# Patient Record
Sex: FEMALE | Race: BLACK OR AFRICAN AMERICAN | NOT HISPANIC OR LATINO | Employment: OTHER | URBAN - METROPOLITAN AREA
[De-identification: names, ages, dates, MRNs, and addresses within clinical notes are randomized per-mention and may not be internally consistent; named-entity substitution may affect disease eponyms.]

---

## 2022-02-08 ENCOUNTER — TELEPHONE (OUTPATIENT)
Dept: GASTROENTEROLOGY | Facility: CLINIC | Age: 67
End: 2022-02-08

## 2022-02-08 ENCOUNTER — TELEPHONE (OUTPATIENT)
Dept: GASTROENTEROLOGY | Facility: CLINIC | Age: 67
End: 2022-02-08
Payer: MEDICARE

## 2022-02-08 NOTE — TELEPHONE ENCOUNTER
----- Message from Joseline Rodriguez sent at 2/8/2022  9:31 AM CST -----  Katekhoi LongBlevins stated that someone left a voicemail for her to give the office a call back to reschedule one of her upcoming appointment with Dr Tejada. Please give her a call back at [phone] (home)

## 2022-02-08 NOTE — TELEPHONE ENCOUNTER
----- Message from Kiera Emanuel sent at 2/8/2022  9:47 AM CST -----  .Type:  Patient Returning Call    Who Called:self  Who Left Message for Patient:  Does the patient know what this is regarding?: appt  Would the patient rather a call back or a response via [facility]? call  Best Call Back Number:.[phone] (home)   Additional Information:

## 2022-04-25 DIAGNOSIS — K92.1 MELENA: Primary | ICD-10-CM

## 2022-04-28 ENCOUNTER — TELEPHONE (OUTPATIENT)
Dept: GASTROENTEROLOGY | Facility: CLINIC | Age: 67
End: 2022-04-28
Payer: MEDICARE

## 2022-04-28 NOTE — TELEPHONE ENCOUNTER
----- Message from Joseline Rodriguez sent at 4/28/2022 11:20 AM CDT -----  Kate Blevins is calling to schedule her colonoscopy and upper gi with Dr Tejada. Please give her a call back at [phone] (home)

## 2022-04-28 NOTE — TELEPHONE ENCOUNTER
She is not due until 12/10/2023 for her colon recheck. Last OV says okay to EGD as needed. Okay to schedule?- PARIS

## 2022-04-28 NOTE — TELEPHONE ENCOUNTER
----- Message from Joseline Rodriguez sent at 4/28/2022 11:20 AM CDT -----  Kate Blevins is calling to schedule her colonoscopy and upper gi with Dr Tejada. Please give her a call back at [phone] (home)

## 2022-05-04 NOTE — TELEPHONE ENCOUNTER
Add her to [facility] clinic 5/12/2022. [facility] 2020[facility]. Due for colon as well. Not sure if should be at same time as EGD based on clinical presentation and medical conditions.  NBP

## 2022-05-04 NOTE — TELEPHONE ENCOUNTER
Called patient to add to [facility] clinic no answer left message-PARIS

## 2022-05-06 NOTE — PROGRESS NOTES
Clinic Note    Reason for visit:  The primary encounter diagnosis was Melena. Diagnoses of Esophageal dysphagia, Gastroesophageal reflux disease, unspecified whether esophagitis present, Chronic idiopathic constipation, Delayed gastric emptying, and History of colon polyps were also pertinent to this visit.    PCP: Heather Ghosh       HPI:  This is a 67 y.o. female who is established with Dr. Tejada. Patient has h/o esophageal stricture s/p dilation 2018 dilated to 48Fr and GERD, taking pantoprazole 40 daily. Patient had episode of melena and epigastric pain around a month ago, which began after taking ibuprofen for a migraine. Stool has returned to normal. Patient has h/o constipation and stool has been pellet-like, has tried MiraLAX in the past, tried Trulance and reports that it worked but is unable to take it due to cost. Denies BRBPR. Also reports dysphagia with solids x 3 months.     Last EGD 1/28/2020: DBx nl, GBx wnl, EBx reflux  Last colonoscopy 12/10/2018: 1 TA, 1 hyperp, repeat colon in 5y    Review of Systems   Constitutional: Negative for chills, diaphoresis, fatigue, fever and unexpected weight change.   HENT: Negative for mouth sores, nosebleeds, postnasal drip, sore throat, trouble swallowing and voice change.    Eyes: Negative for pain, discharge and eye dryness.   Respiratory: Negative for apnea, cough, choking, chest tightness, shortness of breath and wheezing.    Cardiovascular: Negative for chest pain, palpitations, leg swelling and claudication.   Gastrointestinal: Positive for constipation and reflux. Negative for abdominal distention, abdominal pain, anal bleeding, blood in stool, change in bowel habit, diarrhea, nausea, rectal pain, vomiting, fecal incontinence and change in bowel habit.   Genitourinary: Negative for bladder incontinence, difficulty urinating, dysuria, flank pain, frequency and hematuria.   Musculoskeletal: Negative for arthralgias, back pain, joint swelling and joint  "deformity.   Integumentary:  Negative for color change, rash and wound.   Allergic/Immunologic: Positive for environmental allergies. Negative for food allergies.   Neurological: Negative for seizures, facial asymmetry, speech difficulty, weakness, headaches and memory loss.   Hematological: Negative for adenopathy. Does not bruise/bleed easily.   Psychiatric/Behavioral: Negative for agitation, behavioral problems, confusion, hallucinations and sleep disturbance.      Past Medical History:   Diagnosis Date    Anxiety disorder, unspecified     Arthritis     Brain aneurysm     Disorder of thyroid, unspecified     Dyslipidemia     Essential (primary) hypertension     Fibromyalgia     GERD (gastroesophageal reflux disease)     High blood pressure     Migraines     Mild intermittent asthma, uncomplicated     Rheumatoid arthritis, unspecified     Systemic lupus erythematosus, organ or system involvement unspecified      Past Surgical History:   Procedure Laterality Date    APPENDECTOMY      BACK SURGERY  2007     SECTION      CHOLECYSTECTOMY      KNEE SURGERY       Family History   Problem Relation Age of Onset    Ovarian cancer Mother 84    Hypertension Mother      Social History     Tobacco Use    Smoking status: Never Smoker    Smokeless tobacco: Never Used   Substance Use Topics    Alcohol use: Never    Drug use: Never     Review of patient's allergies indicates:   Allergen Reactions    Latex, natural rubber     Penicillin             Vital Signs:  BP (!) 154/78 (BP Location: Right arm, Patient Position: Sitting, BP Method: Medium (Automatic))   Ht 5' 3" (1.6 m)   Wt 76.2 kg (168 lb)   BMI 29.76 kg/m²   Body mass index is 29.76 kg/m².      Physical Exam  Vitals reviewed.   Constitutional:       General: She is awake. She is not in acute distress.     Appearance: Normal appearance. She is well-developed. She is not ill-appearing, toxic-appearing or diaphoretic.   HENT:      Head: " Normocephalic and atraumatic.      Nose: Nose normal.      Mouth/Throat:      Mouth: Mucous membranes are moist.      Pharynx: Oropharynx is clear. No oropharyngeal exudate or posterior oropharyngeal erythema.   Eyes:      General: Lids are normal. Gaze aligned appropriately. No scleral icterus.        Right eye: No discharge.         Left eye: No discharge.      Extraocular Movements: Extraocular movements intact.      Conjunctiva/sclera: Conjunctivae normal.   Neck:      Trachea: Trachea normal.   Cardiovascular:      Rate and Rhythm: Normal rate and regular rhythm.      Pulses:           Radial pulses are 2+ on the right side and 2+ on the left side.   Pulmonary:      Effort: Pulmonary effort is normal. No respiratory distress.      Breath sounds: Normal breath sounds. No stridor. No wheezing or rhonchi.   Chest:      Chest wall: No tenderness.   Abdominal:      General: Bowel sounds are normal. There is no distension.      Palpations: Abdomen is soft. There is no fluid wave, hepatomegaly or mass.      Tenderness: There is no abdominal tenderness. There is no guarding or rebound.   Musculoskeletal:         General: No tenderness or deformity.      Cervical back: Full passive range of motion without pain and neck supple. No tenderness.      Right lower leg: No edema.      Left lower leg: No edema.   Lymphadenopathy:      Cervical: No cervical adenopathy.   Skin:     General: Skin is warm and dry.      Capillary Refill: Capillary refill takes less than 2 seconds.      Coloration: Skin is not cyanotic, jaundiced or pale.      Findings: No rash.   Neurological:      General: No focal deficit present.      Mental Status: She is alert and oriented to person, place, and time.      Cranial Nerves: No facial asymmetry.      Motor: No tremor.   Psychiatric:         Attention and Perception: Attention normal.         Mood and Affect: Mood and affect normal.         Speech: Speech normal.         Behavior: Behavior normal.  Behavior is cooperative.            All of the data above and below has been reviewed by myself and any further interpretations will be reflected in the assessment and plan.   The data includes review of external notes, and independent interpretation of lab results, procedures, x-rays, and imaging reports.      Assessment:  Melena  -     Ambulatory Referral to External Surgery    Esophageal dysphagia  -     Ambulatory Referral to External Surgery    Gastroesophageal reflux disease, unspecified whether esophagitis present  -     Ambulatory Referral to External Surgery    Chronic idiopathic constipation  -     lubiprostone (AMITIZA) 8 MCG Cap; Take 1 capsule (8 mcg total) by mouth 2 (two) times daily with meals.  Dispense: 60 capsule; Refill: 1    Delayed gastric emptying    History of colon polyps    ulcer v H. Pylori v inflam v GERD  Due for screening colonoscopy 12/2023. Trial of amitiza for constipation.     Recommendations:  Schedule upper endoscopy with Dr. Tejada.   Continue pantoprazole 40 mg once daily.  Trial of Amitiza for constipation. Patient will let us know if this helps her symptoms. If unable to take Amitiza she will begin taking MiraLAX, 1 capful daily, and titrate dose up or down until having daily, soft bowel movements.    Risks, benefits, and alternatives of medical management, any associated procedures, and/or treatment discussed with the patient. Patient given opportunity to ask questions and voices understanding. Patient has elected to proceed with the recommended care modalities as discussed.    Follow up with Dr. Tejada in 6 months.     Order summary:  Orders Placed This Encounter   Procedures    Ambulatory Referral to External Surgery          Megan Santana NP    This document may have been created using a voice recognition transcribing system. Incorrect words or phrases may have been missed during proofreading. Please interpret accordingly or contact me for clarification.

## 2022-05-12 ENCOUNTER — OFFICE VISIT (OUTPATIENT)
Dept: GASTROENTEROLOGY | Facility: CLINIC | Age: 67
End: 2022-05-12
Payer: MEDICARE

## 2022-05-12 VITALS
HEIGHT: 63 IN | SYSTOLIC BLOOD PRESSURE: 154 MMHG | BODY MASS INDEX: 29.77 KG/M2 | WEIGHT: 168 LBS | DIASTOLIC BLOOD PRESSURE: 78 MMHG

## 2022-05-12 DIAGNOSIS — K30 DELAYED GASTRIC EMPTYING: ICD-10-CM

## 2022-05-12 DIAGNOSIS — K21.9 GASTROESOPHAGEAL REFLUX DISEASE, UNSPECIFIED WHETHER ESOPHAGITIS PRESENT: ICD-10-CM

## 2022-05-12 DIAGNOSIS — K59.04 CHRONIC IDIOPATHIC CONSTIPATION: ICD-10-CM

## 2022-05-12 DIAGNOSIS — K92.1 MELENA: Primary | ICD-10-CM

## 2022-05-12 DIAGNOSIS — Z86.010 HISTORY OF COLON POLYPS: ICD-10-CM

## 2022-05-12 DIAGNOSIS — R13.19 ESOPHAGEAL DYSPHAGIA: ICD-10-CM

## 2022-05-12 PROCEDURE — 99203 OFFICE O/P NEW LOW 30 MIN: CPT | Mod: S$GLB,,,

## 2022-05-12 PROCEDURE — 99203 PR OFFICE/OUTPT VISIT, NEW, LEVL III, 30-44 MIN: ICD-10-PCS | Mod: S$GLB,,,

## 2022-05-12 RX ORDER — LUBIPROSTONE 8 UG/1
8 CAPSULE ORAL 2 TIMES DAILY WITH MEALS
Qty: 60 CAPSULE | Refills: 1 | Status: SHIPPED | OUTPATIENT
Start: 2022-05-12 | End: 2022-11-14

## 2022-05-12 RX ORDER — PILOCARPINE HYDROCHLORIDE 5 MG/1
5 TABLET, FILM COATED ORAL 3 TIMES DAILY
COMMUNITY
Start: 2022-04-19

## 2022-05-12 RX ORDER — AMITRIPTYLINE HYDROCHLORIDE 50 MG/1
150 TABLET, FILM COATED ORAL NIGHTLY
COMMUNITY
Start: 2022-03-15

## 2022-05-12 RX ORDER — GABAPENTIN 300 MG/1
300 CAPSULE ORAL NIGHTLY
COMMUNITY
Start: 2022-04-19

## 2022-05-12 RX ORDER — ZOLPIDEM TARTRATE 5 MG/1
5 TABLET ORAL DAILY
COMMUNITY
Start: 2022-02-14 | End: 2023-11-14

## 2022-05-12 RX ORDER — LISINOPRIL 10 MG/1
10 TABLET ORAL 2 TIMES DAILY
COMMUNITY
Start: 2022-02-13 | End: 2023-11-14

## 2022-05-12 RX ORDER — LISINOPRIL 20 MG/1
20 TABLET ORAL DAILY
COMMUNITY
Start: 2022-03-15

## 2022-05-12 RX ORDER — ATORVASTATIN CALCIUM 40 MG/1
40 TABLET, FILM COATED ORAL NIGHTLY
COMMUNITY
Start: 2022-04-19 | End: 2023-11-14

## 2022-05-12 RX ORDER — MONTELUKAST SODIUM 10 MG/1
10 TABLET ORAL DAILY
COMMUNITY
Start: 2022-03-17

## 2022-05-12 RX ORDER — ADALIMUMAB 40MG/0.4ML
40 KIT SUBCUTANEOUS WEEKLY
COMMUNITY
Start: 2022-04-25 | End: 2023-02-16

## 2022-05-12 RX ORDER — PANTOPRAZOLE SODIUM 40 MG/1
40 TABLET, DELAYED RELEASE ORAL DAILY
COMMUNITY
Start: 2021-12-27 | End: 2022-05-26 | Stop reason: SDUPTHER

## 2022-05-12 RX ORDER — PREDNISONE 5 MG/1
5 TABLET ORAL DAILY
COMMUNITY
Start: 2022-02-16 | End: 2023-11-14

## 2022-05-12 RX ORDER — TIZANIDINE 2 MG/1
2 TABLET ORAL 2 TIMES DAILY PRN
COMMUNITY
Start: 2022-04-19

## 2022-05-12 RX ORDER — ALPRAZOLAM 0.5 MG/1
0.5 TABLET ORAL DAILY PRN
COMMUNITY
Start: 2022-04-28 | End: 2023-11-14

## 2022-05-13 ENCOUNTER — TELEPHONE (OUTPATIENT)
Dept: GASTROENTEROLOGY | Facility: CLINIC | Age: 67
End: 2022-05-13
Payer: MEDICARE

## 2022-05-13 DIAGNOSIS — K21.9 GASTROESOPHAGEAL REFLUX DISEASE, UNSPECIFIED WHETHER ESOPHAGITIS PRESENT: ICD-10-CM

## 2022-05-13 DIAGNOSIS — K92.1 MELENA: Primary | ICD-10-CM

## 2022-05-13 DIAGNOSIS — R13.19 ESOPHAGEAL DYSPHAGIA: ICD-10-CM

## 2022-05-13 NOTE — TELEPHONE ENCOUNTER
----- Message from Kiera Emanuel sent at 5/13/2022 11:06 AM CDT -----  pt states that she can hardly swallow, needs call back..[phone] (home)

## 2022-05-19 ENCOUNTER — TELEPHONE (OUTPATIENT)
Dept: GASTROENTEROLOGY | Facility: CLINIC | Age: 67
End: 2022-05-19
Payer: MEDICARE

## 2022-05-19 ENCOUNTER — OUTSIDE PLACE OF SERVICE (OUTPATIENT)
Dept: GASTROENTEROLOGY | Facility: CLINIC | Age: 67
End: 2022-05-19
Payer: MEDICARE

## 2022-05-19 DIAGNOSIS — R13.19 ESOPHAGEAL DYSPHAGIA: Primary | ICD-10-CM

## 2022-05-19 LAB — EGD FOLLOW UP EXTERNAL: NORMAL

## 2022-05-19 PROCEDURE — 43235 EGD DIAGNOSTIC BRUSH WASH: CPT | Mod: ,,, | Performed by: INTERNAL MEDICINE

## 2022-05-19 PROCEDURE — 43235 PR EGD, FLEX, DIAGNOSTIC: ICD-10-PCS | Mod: ,,, | Performed by: INTERNAL MEDICINE

## 2022-05-19 NOTE — TELEPHONE ENCOUNTER
Food in stomach during EGD today limited any dilation. Her stricture was fairly patient. Okay to send EM order and plan for EGD with dilation at [facility] with liquids the day prior 6/14/2022.  DAVID

## 2022-06-12 RX ORDER — PANTOPRAZOLE SODIUM 40 MG/1
40 TABLET, DELAYED RELEASE ORAL DAILY
Qty: 90 TABLET | Refills: 1 | Status: SHIPPED | OUTPATIENT
Start: 2022-06-12 | End: 2022-11-14 | Stop reason: SDUPTHER

## 2022-06-13 ENCOUNTER — TELEPHONE (OUTPATIENT)
Dept: GASTROENTEROLOGY | Facility: CLINIC | Age: 67
End: 2022-06-13
Payer: MEDICARE

## 2022-06-13 NOTE — TELEPHONE ENCOUNTER
----- Message from Kiera Emanuel sent at 6/13/2022  8:12 AM CDT -----  PT NEEDS CALL BACK TO RESCHEDULE DUE TO MARK..[phone] (home)

## 2022-08-08 ENCOUNTER — TELEPHONE (OUTPATIENT)
Dept: GASTROENTEROLOGY | Facility: CLINIC | Age: 67
End: 2022-08-08
Payer: MEDICARE

## 2022-08-08 DIAGNOSIS — Z86.010 HISTORY OF COLON POLYPS: Primary | ICD-10-CM

## 2022-08-08 DIAGNOSIS — K92.1 MELENA: ICD-10-CM

## 2022-08-08 DIAGNOSIS — R13.19 ESOPHAGEAL DYSPHAGIA: ICD-10-CM

## 2022-08-08 DIAGNOSIS — K21.9 GASTROESOPHAGEAL REFLUX DISEASE, UNSPECIFIED WHETHER ESOPHAGITIS PRESENT: ICD-10-CM

## 2022-08-08 DIAGNOSIS — K59.04 CHRONIC IDIOPATHIC CONSTIPATION: ICD-10-CM

## 2022-08-08 DIAGNOSIS — K30 DELAYED GASTRIC EMPTYING: ICD-10-CM

## 2022-08-09 ENCOUNTER — OUTSIDE PLACE OF SERVICE (OUTPATIENT)
Dept: GASTROENTEROLOGY | Facility: CLINIC | Age: 67
End: 2022-08-09
Payer: MEDICARE

## 2022-08-09 LAB — EGD FOLLOW UP EXTERNAL: NORMAL

## 2022-08-09 PROCEDURE — 43248 EGD GUIDE WIRE INSERTION: CPT | Mod: ,,, | Performed by: INTERNAL MEDICINE

## 2022-08-09 PROCEDURE — 43248 PR EGD, FLEX, W/DILATION OVER GUIDEWIRE: ICD-10-PCS | Mod: ,,, | Performed by: INTERNAL MEDICINE

## 2022-11-14 ENCOUNTER — OFFICE VISIT (OUTPATIENT)
Dept: GASTROENTEROLOGY | Facility: CLINIC | Age: 67
End: 2022-11-14
Payer: MEDICARE

## 2022-11-14 ENCOUNTER — TELEPHONE (OUTPATIENT)
Dept: GASTROENTEROLOGY | Facility: CLINIC | Age: 67
End: 2022-11-14

## 2022-11-14 VITALS
DIASTOLIC BLOOD PRESSURE: 76 MMHG | WEIGHT: 171 LBS | SYSTOLIC BLOOD PRESSURE: 148 MMHG | OXYGEN SATURATION: 98 % | TEMPERATURE: 99 F | HEART RATE: 74 BPM | HEIGHT: 64 IN | BODY MASS INDEX: 29.19 KG/M2

## 2022-11-14 DIAGNOSIS — Z12.11 SCREENING FOR COLON CANCER: ICD-10-CM

## 2022-11-14 DIAGNOSIS — Z86.010 HISTORY OF COLON POLYPS: Primary | ICD-10-CM

## 2022-11-14 DIAGNOSIS — Z86.010 HISTORY OF COLON POLYPS: ICD-10-CM

## 2022-11-14 DIAGNOSIS — K30 DELAYED GASTRIC EMPTYING: ICD-10-CM

## 2022-11-14 DIAGNOSIS — R13.19 ESOPHAGEAL DYSPHAGIA: ICD-10-CM

## 2022-11-14 DIAGNOSIS — K21.9 GASTROESOPHAGEAL REFLUX DISEASE, UNSPECIFIED WHETHER ESOPHAGITIS PRESENT: Primary | ICD-10-CM

## 2022-11-14 PROCEDURE — 99215 OFFICE O/P EST HI 40 MIN: CPT | Mod: S$GLB,,, | Performed by: INTERNAL MEDICINE

## 2022-11-14 PROCEDURE — 99215 PR OFFICE/OUTPT VISIT, EST, LEVL V, 40-54 MIN: ICD-10-PCS | Mod: S$GLB,,, | Performed by: INTERNAL MEDICINE

## 2022-11-14 RX ORDER — SOD SULF/POT CHLORIDE/MAG SULF 1.479 G
12 TABLET ORAL DAILY
Qty: 24 TABLET | Refills: 0 | Status: SHIPPED | OUTPATIENT
Start: 2022-11-14 | End: 2023-11-14

## 2022-11-14 RX ORDER — PANTOPRAZOLE SODIUM 40 MG/1
40 TABLET, DELAYED RELEASE ORAL DAILY
Qty: 90 TABLET | Refills: 4 | Status: SHIPPED | OUTPATIENT
Start: 2022-11-14 | End: 2023-11-14 | Stop reason: SDUPTHER

## 2022-11-14 RX ORDER — SOD SULF/POT CHLORIDE/MAG SULF 1.479 G
12 TABLET ORAL DAILY
Qty: 24 TABLET | Refills: 0 | Status: SHIPPED | OUTPATIENT
Start: 2022-11-14 | End: 2022-11-14

## 2022-11-14 NOTE — PROGRESS NOTES
Clinic Note    Reason for visit:  The primary encounter diagnosis was Gastroesophageal reflux disease, unspecified whether esophagitis present. Diagnoses of Delayed gastric emptying, Esophageal dysphagia, History of colon polyps, and Screening for colon cancer were also pertinent to this visit.    PCP: Heather Ghosh       HPI:  This is a 67 y.o. female we performed an upper endoscopy on her earlier this year with dilation to 54 [place].  Her swallowing symptoms have significantly improved since then.  Tomato based foods and eating late or some of her reflux triggers.  Otherwise she has not been swallowing.  In doing well overall.  She is a history of colonic polyps would like to continue on with her colonoscopy for surveillance given her family history of cancers.    Review of Systems   Constitutional:  Negative for chills, diaphoresis, fatigue, fever and unexpected weight change.   HENT:  Negative for mouth sores, nosebleeds, postnasal drip, sore throat, trouble swallowing and voice change.    Eyes:  Negative for pain, discharge and eye dryness.   Respiratory:  Negative for apnea, cough, choking, chest tightness, shortness of breath and wheezing.    Cardiovascular:  Negative for chest pain, palpitations, leg swelling and claudication.   Gastrointestinal:  Positive for reflux. Negative for abdominal distention, abdominal pain, anal bleeding, blood in stool, change in bowel habit, constipation, diarrhea, nausea, rectal pain, vomiting, fecal incontinence and change in bowel habit.   Genitourinary:  Positive for bladder incontinence. Negative for difficulty urinating, dysuria, flank pain, frequency and hematuria.   Musculoskeletal:  Negative for arthralgias, back pain, joint swelling and joint deformity.   Integumentary:  Negative for color change, rash and wound.   Allergic/Immunologic: Positive for environmental allergies and food allergies.   Neurological:  Negative for seizures, facial asymmetry, speech  difficulty, weakness, headaches and memory loss.   Hematological:  Negative for adenopathy. Does not bruise/bleed easily.   Psychiatric/Behavioral:  Negative for agitation, behavioral problems, confusion, hallucinations and sleep disturbance.       Past Medical History:   Diagnosis Date    Anxiety disorder, unspecified     Arthritis     BMI 29.0-29.9,adult     Colon polyp     Disorder of thyroid, unspecified     Dyslipidemia     Essential (primary) hypertension     Fibromyalgia     GERD (gastroesophageal reflux disease)     High blood pressure     Migraines     Mild intermittent asthma, uncomplicated     Rheumatoid arthritis, unspecified     Sleep apnea, unspecified     C PAP    Systemic lupus erythematosus, organ or system involvement unspecified      Past Surgical History:   Procedure Laterality Date    APPENDECTOMY      BACK SURGERY  2007     SECTION      CHOLECYSTECTOMY      COLONOSCOPY      KNEE SURGERY Right     TOE SURGERY Left     UPPER GASTROINTESTINAL ENDOSCOPY       Family History   Problem Relation Age of Onset    Ovarian cancer Mother 84    Hypertension Mother     Dementia Father     Liver cancer Neg Hx     Liver disease Neg Hx     Esophageal cancer Neg Hx     Pancreatic cancer Neg Hx     Stomach cancer Neg Hx     Colon cancer Neg Hx     Ulcerative colitis Neg Hx     Crohn's disease Neg Hx     Throat cancer Neg Hx      Social History     Tobacco Use    Smoking status: Never    Smokeless tobacco: Never   Substance Use Topics    Alcohol use: Never    Drug use: Never     Review of patient's allergies indicates:   Allergen Reactions    Latex, natural rubber Swelling    Penicillin Swelling        Medication List with Changes/Refills   New Medications    SOD SULF-POT CHLORIDE-MAG SULF (SUTAB) 1.479-0.188- 0.225 GRAM TABLET    Take 12 tablets by mouth once daily. Take according to package instructions with indicated amount of water. No breakfast day before test. May substitute with Suprep, Clenpiq,  "Plenvu, Moviprep or GoLytely based on Rx plan and patient preference.   Current Medications    ALPRAZOLAM (XANAX) 0.5 MG TABLET    Take 0.5 mg by mouth daily as needed.    AMITRIPTYLINE (ELAVIL) 50 MG TABLET    Take 50 mg by mouth every evening.    ATORVASTATIN (LIPITOR) 40 MG TABLET    Take 40 mg by mouth every evening.    GABAPENTIN (NEURONTIN) 300 MG CAPSULE    Take 300 mg by mouth nightly.    HUMIRA,CF, PEN 40 MG/0.4 ML PNKT    Inject 40 mg as directed once a week.    LISINOPRIL (PRINIVIL,ZESTRIL) 20 MG TABLET    Take 20 mg by mouth once daily.    LISINOPRIL 10 MG TABLET    Take 10 mg by mouth 2 (two) times daily.    MONTELUKAST (SINGULAIR) 10 MG TABLET    Take 10 mg by mouth once daily at 6am.    PILOCARPINE (SALAGEN) 5 MG TAB    Take 5 mg by mouth 3 (three) times daily.    PREDNISONE (DELTASONE) 5 MG TABLET    Take 5 mg by mouth once daily at 6am.    TIZANIDINE (ZANAFLEX) 2 MG TABLET    Take 2 mg by mouth 2 (two) times daily as needed.    ZOLPIDEM (AMBIEN) 5 MG TAB    Take 5 mg by mouth once daily.   Changed and/or Refilled Medications    Modified Medication Previous Medication    PANTOPRAZOLE (PROTONIX) 40 MG TABLET pantoprazole (PROTONIX) 40 MG tablet       Take 1 tablet (40 mg total) by mouth once daily.    Take 1 tablet (40 mg total) by mouth once daily at 6am.   Discontinued Medications    LUBIPROSTONE (AMITIZA) 8 MCG CAP    Take 1 capsule (8 mcg total) by mouth 2 (two) times daily with meals.         Vital Signs:  BP (!) 148/76   Pulse 74   Temp 98.6 °F (37 °C)   Ht 5' 4" (1.626 m)   Wt 77.6 kg (171 lb)   SpO2 98%   BMI 29.35 kg/m²         Physical Exam  Vitals reviewed.   Constitutional:       General: She is awake. She is not in acute distress.     Appearance: Normal appearance. She is well-developed. She is not ill-appearing, toxic-appearing or diaphoretic.   HENT:      Head: Normocephalic and atraumatic.      Nose: Nose normal.      Mouth/Throat:      Mouth: Mucous membranes are moist.      " Pharynx: Oropharynx is clear. No oropharyngeal exudate or posterior oropharyngeal erythema.   Eyes:      General: Lids are normal. Gaze aligned appropriately. No scleral icterus.        Right eye: No discharge.         Left eye: No discharge.      Extraocular Movements: Extraocular movements intact.      Conjunctiva/sclera: Conjunctivae normal.   Neck:      Trachea: Trachea normal.   Cardiovascular:      Rate and Rhythm: Normal rate and regular rhythm.      Pulses:           Radial pulses are 2+ on the right side and 2+ on the left side.   Pulmonary:      Effort: Pulmonary effort is normal. No respiratory distress.      Breath sounds: Normal breath sounds. No stridor. No wheezing or rhonchi.   Chest:      Chest wall: No tenderness.   Abdominal:      General: Bowel sounds are normal. There is no distension.      Palpations: Abdomen is soft. There is no fluid wave, hepatomegaly or mass.      Tenderness: There is no abdominal tenderness. There is no guarding or rebound.   Musculoskeletal:         General: No tenderness or deformity.      Cervical back: Full passive range of motion without pain and neck supple. No tenderness.      Right lower leg: No edema.      Left lower leg: No edema.   Lymphadenopathy:      Cervical: No cervical adenopathy.   Skin:     General: Skin is warm and dry.      Capillary Refill: Capillary refill takes less than 2 seconds.      Coloration: Skin is not cyanotic, jaundiced or pale.      Findings: No rash.   Neurological:      General: No focal deficit present.      Mental Status: She is alert and oriented to person, place, and time.      Cranial Nerves: No facial asymmetry.      Motor: No tremor.   Psychiatric:         Attention and Perception: Attention normal.         Mood and Affect: Mood and affect normal.         Speech: Speech normal.         Behavior: Behavior normal. Behavior is cooperative.          All of the data above and below has been reviewed by myself and any further  interpretations will be reflected in the assessment and plan.   The data includes review of external notes, and independent interpretation of lab results, procedures, x-rays, and imaging reports.      Assessment:  Gastroesophageal reflux disease, unspecified whether esophagitis present  -     pantoprazole (PROTONIX) 40 MG tablet; Take 1 tablet (40 mg total) by mouth once daily.  Dispense: 90 tablet; Refill: 4    Delayed gastric emptying    Esophageal dysphagia    History of colon polyps  -     Cancel: Ambulatory Referral to External Surgery  -     Ambulatory Referral to External Surgery    Screening for colon cancer  -     Cancel: Ambulatory Referral to External Surgery  -     Ambulatory Referral to External Surgery    Other orders  -     sod sulf-pot chloride-mag sulf (SUTAB) 1.479-0.188- 0.225 gram tablet; Take 12 tablets by mouth once daily. Take according to package instructions with indicated amount of water. No breakfast day before test. May substitute with Suprep, Clenpiq, Plenvu, Moviprep or GoLytely based on Rx plan and patient preference.  Dispense: 24 tablet; Refill: 0  -     Discontinue: sod sulf-pot chloride-mag sulf (SUTAB) 1.479-0.188- 0.225 gram tablet; Take 12 tablets by mouth once daily. Take according to package instructions with indicated amount of water. No breakfast day before test. May substitute with Suprep, Clenpiq, Plenvu, Moviprep or GoLytely based on Rx plan and patient preference.  Dispense: 24 tablet; Refill: 0    May try to minimize acid suppression therapy by going to every other day as able.  This is if you are continuing to doing well.  Notify my office if any issues with her swallowing.  We will plan for repeat colonoscopy.      Recommendations:  Schedule colonoscopy.    Notify my office with you new health insurance information once you have it so that we can run your procedure through that plan.    Continue pantoprazole daily.  May attempt to take every other day.    Please  notify my office if you have not been contacted within two weeks after any procedures, submitting any samples (biopsies, blood, stool, urine, etc.) or after any imaging (X-ray, CT, MRI, etc.).     Risks, benefits, and alternatives of medical management, any associated procedures, and/or treatment discussed with the patient. Patient given opportunity to ask questions and voices understanding. Patient has elected to proceed with the recommended care modalities as discussed.    Follow up in about 1 year (around 11/14/2023).    Order summary:  Orders Placed This Encounter   Procedures    Ambulatory Referral to External Surgery        Instructed patient to notify my office if they have not been contacted within two weeks after any procedures, submitting any samples (biopsies, blood, stool, urine, etc.) or after any imaging (X-ray, CT, MRI, etc.).     Iveth Tejada MD    This document may have been created using a voice recognition transcribing system. Incorrect words or phrases may have been missed during proofreading. Please interpret accordingly or contact me for clarification.

## 2022-11-14 NOTE — PATIENT INSTRUCTIONS
Schedule colonoscopy.    Notify my office with you new health insurance information once you have it so that we can run your procedure through that plan.    Continue pantoprazole daily.  May attempt to take every other day.    Please notify my office if you have not been contacted within two weeks after any procedures, submitting any samples (biopsies, blood, stool, urine, etc.) or after any imaging (X-ray, CT, MRI, etc.).

## 2022-11-14 NOTE — TELEPHONE ENCOUNTER
"[facility]  [address] [address]  Phone: [phone]  Fax: [phone]    History & Physical         Provider: Dr. Iveth Tejada    Patient Name: Kate HARKINS (age):1955  67 y.o.           Gender: female   Phone: [phone]     Referring Physician: Heather Ghosh     Vital Signs:   Height - 5' 4"  Weight - 171 lbs  BMI -  29.35    Plan: Colonoscopy    Encounter Diagnoses   Name Primary?    History of colon polyps Yes    Screening for colon cancer            History:      Past Medical History:   Diagnosis Date    Anxiety disorder, unspecified     Arthritis     BMI 29.0-29.9,adult     Colon polyp     Disorder of thyroid, unspecified     Dyslipidemia     Essential (primary) hypertension     Fibromyalgia     GERD (gastroesophageal reflux disease)     High blood pressure     Migraines     Mild intermittent asthma, uncomplicated     Rheumatoid arthritis, unspecified     Sleep apnea, unspecified     C PAP    Systemic lupus erythematosus, organ or system involvement unspecified       Past Surgical History:   Procedure Laterality Date    APPENDECTOMY      BACK SURGERY  2007     SECTION      CHOLECYSTECTOMY      COLONOSCOPY      KNEE SURGERY Right     TOE SURGERY Left     UPPER GASTROINTESTINAL ENDOSCOPY        Medication List with Changes/Refills   Current Medications    ALPRAZOLAM (XANAX) 0.5 MG TABLET    Take 0.5 mg by mouth daily as needed.    AMITRIPTYLINE (ELAVIL) 50 MG TABLET    Take 50 mg by mouth every evening.    ATORVASTATIN (LIPITOR) 40 MG TABLET    Take 40 mg by mouth every evening.    GABAPENTIN (NEURONTIN) 300 MG CAPSULE    Take 300 mg by mouth nightly.    HUMIRA,CF, PEN 40 MG/0.4 ML PNKT    Inject 40 mg as directed once a week.    LISINOPRIL (PRINIVIL,ZESTRIL) 20 MG TABLET    Take 20 mg by mouth once daily.    LISINOPRIL 10 MG TABLET    Take 10 mg by mouth 2 (two) " times daily.    MONTELUKAST (SINGULAIR) 10 MG TABLET    Take 10 mg by mouth once daily at 6am.    PANTOPRAZOLE (PROTONIX) 40 MG TABLET    Take 1 tablet (40 mg total) by mouth once daily.    PILOCARPINE (SALAGEN) 5 MG TAB    Take 5 mg by mouth 3 (three) times daily.    PREDNISONE (DELTASONE) 5 MG TABLET    Take 5 mg by mouth once daily at 6am.    SOD SULF-POT CHLORIDE-MAG SULF (SUTAB) 1.479-0.188- 0.225 GRAM TABLET    Take 12 tablets by mouth once daily. Take according to package instructions with indicated amount of water. No breakfast day before test. May substitute with Suprep, Clenpiq, Plenvu, Moviprep or GoLytely based on Rx plan and patient preference.    TIZANIDINE (ZANAFLEX) 2 MG TABLET    Take 2 mg by mouth 2 (two) times daily as needed.    ZOLPIDEM (AMBIEN) 5 MG TAB    Take 5 mg by mouth once daily.      Review of patient's allergies indicates:   Allergen Reactions    Latex, natural rubber Swelling    Penicillin Swelling      Family History   Problem Relation Age of Onset    Ovarian cancer Mother 84    Hypertension Mother     Dementia Father     Liver cancer Neg Hx     Liver disease Neg Hx     Esophageal cancer Neg Hx     Pancreatic cancer Neg Hx     Stomach cancer Neg Hx     Colon cancer Neg Hx     Ulcerative colitis Neg Hx     Crohn's disease Neg Hx     Throat cancer Neg Hx       Social History     Tobacco Use    Smoking status: Never    Smokeless tobacco: Never   Substance Use Topics    Alcohol use: Never    Drug use: Never        Physical Examination:     General Appearance:___________________________  HEENT:  _____________________________________  Abdomen:____________________________________  Heart:________________________________________  Lungs:_______________________________________  Extremities:___________________________________  Skin:_________________________________________  Endocrine:____________________________________  Genitourinary:_________________________________  Neurological:__________________________________      Patient has been evaluated immediately prior to sedation and is medically cleared for endoscopy with IVCS as an ASA class: ______      Physician Signature: _________________________       Date: ________  Time: ________

## 2022-11-14 NOTE — LETTER
November 14, 2022        Heather Ghosh MD  [address] [facility]  [address]  [place] [address]             [facility]  [address] [address]  Phone: [phone]  Fax: [phone]   Patient: Kate Blevins   MR Number: [id number]   YOB: 1955   Date of Visit: 11/14/2022       Dear Dr. Ghosh:    Thank you for referring Kate Blevins to me for evaluation. Attached you will find relevant portions of my assessment and plan of care.    If you have questions, please do not hesitate to call me. I look forward to following Kate Blevins along with you.    Sincerely,      Iveth Tejada MD            CC  No Recipients    Enclosure

## 2023-02-14 ENCOUNTER — TELEPHONE (OUTPATIENT)
Dept: GASTROENTEROLOGY | Facility: CLINIC | Age: 68
End: 2023-02-14

## 2023-02-14 ENCOUNTER — OUTSIDE PLACE OF SERVICE (OUTPATIENT)
Dept: GASTROENTEROLOGY | Facility: CLINIC | Age: 68
End: 2023-02-14
Payer: MEDICARE

## 2023-02-14 DIAGNOSIS — K22.2 ESOPHAGEAL STRICTURE: ICD-10-CM

## 2023-02-14 DIAGNOSIS — K44.9 HIATAL HERNIA: ICD-10-CM

## 2023-02-14 DIAGNOSIS — R13.10 DYSPHAGIA, UNSPECIFIED TYPE: Primary | ICD-10-CM

## 2023-02-14 LAB — CRC RECOMMENDATION EXT: NORMAL

## 2023-02-14 PROCEDURE — G0105 COLORECTAL SCRN; HI RISK IND: HCPCS | Mod: ,,, | Performed by: INTERNAL MEDICINE

## 2023-02-14 PROCEDURE — G0105 COLORECTAL SCRN; HI RISK IND: ICD-10-PCS | Mod: ,,, | Performed by: INTERNAL MEDICINE

## 2023-02-14 NOTE — TELEPHONE ENCOUNTER
The patient reported just after her colonoscopy that she has been having occasional dysphagia to solids.  On her May 2022 upper endoscopy she was found to have a 13 mm stricture at the EG junction.  She had solid food in her stomach limiting some views and limiting dilation.  The plan was to have a repeat upper endoscopy in the hospital sending with propofol.  In August of 2022 she had that repeat upper endoscopy in her stricture was dilated up to a size 54 [place].  Will plan for repeat dilation.    Okay to contact patient and schedule for upper endoscopy with dilation in the hospital setting.  She should be on liquids the day prior to procedure due to a history of solid food in the stomach on upper endoscopy.  Diagnoses are dysphagia, esophageal stricture, hiatal hernia.  Okay to add to March 21, 2023 procedure schedule at the hospital.  NBP

## 2023-02-16 RX ORDER — HYDROCODONE BITARTRATE AND ACETAMINOPHEN 5; 325 MG/1; MG/1
1 TABLET ORAL EVERY 6 HOURS PRN
COMMUNITY
Start: 2022-12-29

## 2023-02-16 RX ORDER — OLOPATADINE HYDROCHLORIDE 1 MG/ML
1 SOLUTION/ DROPS OPHTHALMIC
COMMUNITY
Start: 2023-01-17

## 2023-02-20 VITALS — HEIGHT: 64 IN | WEIGHT: 171 LBS | BODY MASS INDEX: 29.19 KG/M2

## 2023-02-20 NOTE — TELEPHONE ENCOUNTER
"[facility]  [address] [address]  Phone: [phone]  Fax: [phone]    History & Physical         Provider: Dr. Iveth Tejada    Patient Name: Kate HARKINS (age):1955  67 y.o.           Gender: female   Phone: [phone]     Referring Physician: Heather Ghosh     Vital Signs:   Height - 5' 4"  Weight - 171 lb   BMI -  29.3    Plan: EGD w/ Dil    Encounter Diagnoses   Name Primary?    Dysphagia, unspecified type Yes    Esophageal stricture     Hiatal hernia            History:      Past Medical History:   Diagnosis Date    Anxiety disorder, unspecified     Arthritis     BMI 29.0-29.9,adult     Colon polyp     Disorder of thyroid, unspecified     Dyslipidemia     Essential (primary) hypertension     Fibromyalgia     GERD (gastroesophageal reflux disease)     High blood pressure     Migraines     Mild intermittent asthma, uncomplicated     Rheumatoid arthritis, unspecified     Sleep apnea, unspecified     C PAP    Systemic lupus erythematosus, organ or system involvement unspecified       Past Surgical History:   Procedure Laterality Date    APPENDECTOMY      BACK SURGERY  2007     SECTION      CHOLECYSTECTOMY      COLONOSCOPY      KNEE SURGERY Right     TOE SURGERY Left     UPPER GASTROINTESTINAL ENDOSCOPY        Medication List with Changes/Refills   Current Medications    ALPRAZOLAM (XANAX) 0.5 MG TABLET    Take 0.5 mg by mouth daily as needed.    AMITRIPTYLINE (ELAVIL) 50 MG TABLET    Take 50 mg by mouth every evening.    ATORVASTATIN (LIPITOR) 40 MG TABLET    Take 40 mg by mouth every evening.    GABAPENTIN (NEURONTIN) 300 MG CAPSULE    Take 300 mg by mouth nightly.    HYDROCODONE-ACETAMINOPHEN (NORCO) 5-325 MG PER TABLET    Take 1 tablet by mouth every 6 (six) hours as needed.    LISINOPRIL (PRINIVIL,ZESTRIL) 20 MG TABLET    Take 20 mg by mouth once daily.    " LISINOPRIL 10 MG TABLET    Take 10 mg by mouth 2 (two) times daily.    MONTELUKAST (SINGULAIR) 10 MG TABLET    Take 10 mg by mouth once daily at 6am.    OLOPATADINE (PATANOL) 0.1 % OPHTHALMIC SOLUTION    Place 1 drop into both eyes.    PANTOPRAZOLE (PROTONIX) 40 MG TABLET    Take 1 tablet (40 mg total) by mouth once daily.    PILOCARPINE (SALAGEN) 5 MG TAB    Take 5 mg by mouth 3 (three) times daily.    PREDNISONE (DELTASONE) 5 MG TABLET    Take 5 mg by mouth once daily at 6am.    SOD SULF-POT CHLORIDE-MAG SULF (SUTAB) 1.479-0.188- 0.225 GRAM TABLET    Take 12 tablets by mouth once daily. Take according to package instructions with indicated amount of water. No breakfast day before test. May substitute with Suprep, Clenpiq, Plenvu, Moviprep or GoLytely based on Rx plan and patient preference.    TIZANIDINE (ZANAFLEX) 2 MG TABLET    Take 2 mg by mouth 2 (two) times daily as needed.    ZOLPIDEM (AMBIEN) 5 MG TAB    Take 5 mg by mouth once daily.   Discontinued Medications    HUMIRA,CF, PEN 40 MG/0.4 ML PNKT    Inject 40 mg as directed once a week.      Review of patient's allergies indicates:   Allergen Reactions    Latex, natural rubber Swelling    Penicillin Swelling      Family History   Problem Relation Age of Onset    Ovarian cancer Mother 84    Hypertension Mother     Dementia Father     Liver cancer Neg Hx     Liver disease Neg Hx     Esophageal cancer Neg Hx     Pancreatic cancer Neg Hx     Stomach cancer Neg Hx     Colon cancer Neg Hx     Ulcerative colitis Neg Hx     Crohn's disease Neg Hx     Throat cancer Neg Hx       Social History     Tobacco Use    Smoking status: Never    Smokeless tobacco: Never   Substance Use Topics    Alcohol use: Never    Drug use: Never        Physical Examination:     General Appearance:___________________________  HEENT:  _____________________________________  Abdomen:____________________________________  Heart:________________________________________  Lungs:_______________________________________  Extremities:___________________________________  Skin:_________________________________________  Endocrine:____________________________________  Genitourinary:_________________________________  Neurological:__________________________________      Patient has been evaluated immediately prior to sedation and is medically cleared for endoscopy with IVCS as an ASA class: ______      Physician Signature: _________________________       Date: ________  Time: ________

## 2023-03-16 ENCOUNTER — DOCUMENTATION ONLY (OUTPATIENT)
Dept: GASTROENTEROLOGY | Facility: CLINIC | Age: 68
End: 2023-03-16
Payer: MEDICARE

## 2023-03-28 ENCOUNTER — PATIENT MESSAGE (OUTPATIENT)
Dept: RESEARCH | Facility: HOSPITAL | Age: 68
End: 2023-03-28
Payer: MEDICARE

## 2023-04-21 ENCOUNTER — DOCUMENTATION ONLY (OUTPATIENT)
Dept: GASTROENTEROLOGY | Facility: CLINIC | Age: 68
End: 2023-04-21
Payer: MEDICARE

## 2023-05-09 ENCOUNTER — PATIENT MESSAGE (OUTPATIENT)
Dept: RESEARCH | Facility: HOSPITAL | Age: 68
End: 2023-05-09
Payer: MEDICARE

## 2023-11-14 ENCOUNTER — OFFICE VISIT (OUTPATIENT)
Dept: GASTROENTEROLOGY | Facility: CLINIC | Age: 68
End: 2023-11-14
Payer: MEDICARE

## 2023-11-14 VITALS
HEART RATE: 77 BPM | BODY MASS INDEX: 28.88 KG/M2 | SYSTOLIC BLOOD PRESSURE: 149 MMHG | DIASTOLIC BLOOD PRESSURE: 83 MMHG | WEIGHT: 169.19 LBS | OXYGEN SATURATION: 97 % | HEIGHT: 64 IN

## 2023-11-14 DIAGNOSIS — K59.04 CHRONIC IDIOPATHIC CONSTIPATION: Primary | ICD-10-CM

## 2023-11-14 DIAGNOSIS — Z86.010 HISTORY OF COLON POLYPS: ICD-10-CM

## 2023-11-14 DIAGNOSIS — K22.2 ESOPHAGEAL STRICTURE: ICD-10-CM

## 2023-11-14 DIAGNOSIS — K21.9 GASTROESOPHAGEAL REFLUX DISEASE, UNSPECIFIED WHETHER ESOPHAGITIS PRESENT: ICD-10-CM

## 2023-11-14 DIAGNOSIS — K30 DELAYED GASTRIC EMPTYING: ICD-10-CM

## 2023-11-14 PROCEDURE — 3079F DIAST BP 80-89 MM HG: CPT | Mod: CPTII,S$GLB,, | Performed by: INTERNAL MEDICINE

## 2023-11-14 PROCEDURE — 1160F RVW MEDS BY RX/DR IN RCRD: CPT | Mod: CPTII,S$GLB,, | Performed by: INTERNAL MEDICINE

## 2023-11-14 PROCEDURE — 3008F PR BODY MASS INDEX (BMI) DOCUMENTED: ICD-10-PCS | Mod: CPTII,S$GLB,, | Performed by: INTERNAL MEDICINE

## 2023-11-14 PROCEDURE — 3079F PR MOST RECENT DIASTOLIC BLOOD PRESSURE 80-89 MM HG: ICD-10-PCS | Mod: CPTII,S$GLB,, | Performed by: INTERNAL MEDICINE

## 2023-11-14 PROCEDURE — 3288F FALL RISK ASSESSMENT DOCD: CPT | Mod: CPTII,S$GLB,, | Performed by: INTERNAL MEDICINE

## 2023-11-14 PROCEDURE — 1159F PR MEDICATION LIST DOCUMENTED IN MEDICAL RECORD: ICD-10-PCS | Mod: CPTII,S$GLB,, | Performed by: INTERNAL MEDICINE

## 2023-11-14 PROCEDURE — 3008F BODY MASS INDEX DOCD: CPT | Mod: CPTII,S$GLB,, | Performed by: INTERNAL MEDICINE

## 2023-11-14 PROCEDURE — 99213 OFFICE O/P EST LOW 20 MIN: CPT | Mod: S$GLB,,, | Performed by: INTERNAL MEDICINE

## 2023-11-14 PROCEDURE — 1160F PR REVIEW ALL MEDS BY PRESCRIBER/CLIN PHARMACIST DOCUMENTED: ICD-10-PCS | Mod: CPTII,S$GLB,, | Performed by: INTERNAL MEDICINE

## 2023-11-14 PROCEDURE — 3077F SYST BP >= 140 MM HG: CPT | Mod: CPTII,S$GLB,, | Performed by: INTERNAL MEDICINE

## 2023-11-14 PROCEDURE — 4010F PR ACE/ARB THEARPY RXD/TAKEN: ICD-10-PCS | Mod: CPTII,S$GLB,, | Performed by: INTERNAL MEDICINE

## 2023-11-14 PROCEDURE — 1159F MED LIST DOCD IN RCRD: CPT | Mod: CPTII,S$GLB,, | Performed by: INTERNAL MEDICINE

## 2023-11-14 PROCEDURE — 1101F PR PT FALLS ASSESS DOC 0-1 FALLS W/OUT INJ PAST YR: ICD-10-PCS | Mod: CPTII,S$GLB,, | Performed by: INTERNAL MEDICINE

## 2023-11-14 PROCEDURE — 3077F PR MOST RECENT SYSTOLIC BLOOD PRESSURE >= 140 MM HG: ICD-10-PCS | Mod: CPTII,S$GLB,, | Performed by: INTERNAL MEDICINE

## 2023-11-14 PROCEDURE — 4010F ACE/ARB THERAPY RXD/TAKEN: CPT | Mod: CPTII,S$GLB,, | Performed by: INTERNAL MEDICINE

## 2023-11-14 PROCEDURE — 3288F PR FALLS RISK ASSESSMENT DOCUMENTED: ICD-10-PCS | Mod: CPTII,S$GLB,, | Performed by: INTERNAL MEDICINE

## 2023-11-14 PROCEDURE — 99213 PR OFFICE/OUTPT VISIT, EST, LEVL III, 20-29 MIN: ICD-10-PCS | Mod: S$GLB,,, | Performed by: INTERNAL MEDICINE

## 2023-11-14 PROCEDURE — 1101F PT FALLS ASSESS-DOCD LE1/YR: CPT | Mod: CPTII,S$GLB,, | Performed by: INTERNAL MEDICINE

## 2023-11-14 RX ORDER — PANTOPRAZOLE SODIUM 40 MG/1
40 TABLET, DELAYED RELEASE ORAL DAILY
Qty: 90 TABLET | Refills: 4 | Status: SHIPPED | OUTPATIENT
Start: 2023-11-14

## 2023-11-14 NOTE — PROGRESS NOTES
Clinic Note    Reason for visit:  The primary encounter diagnosis was Chronic idiopathic constipation. Diagnoses of Gastroesophageal reflux disease, unspecified whether esophagitis present, Esophageal stricture, Delayed gastric emptying, and History of colon polyps were also pertinent to this visit.    PCP: Heather Ghosh       HPI:  This is a 68 y.o. female who is here for a follow up. Patient was scheduled for EGD with dilation in 3/2023 for dysphagia but did not have procedure. She states as of now her swallowing is improved but still having acid reflux. She avoids steak and bread.  She taking pantoprazole 40 mg daily and states it does help but still has some reflux with certain foods. She has had some constipation since taking pain medicine. She is supposed to take it TID but has been going without the pain medicine the past few days due to constipation. She started taking MiraLAX 3 days ago.     Colonoscopy 2/14/2023: IH. Normal otherwise. Repeat colon in 3 yr  EGD 8/9/2022: Esophagogastric stricture s/p dilation to 54 Fr, small HH.     Review of Systems   Constitutional:  Positive for fatigue, fever and unexpected weight change.   HENT:  Negative for mouth sores, postnasal drip, sore throat and trouble swallowing.    Eyes:  Positive for eye dryness. Negative for pain and discharge.   Respiratory:  Negative for apnea, cough, choking, chest tightness, shortness of breath and wheezing.    Cardiovascular:  Negative for chest pain, palpitations and leg swelling.   Gastrointestinal:  Positive for constipation. Negative for abdominal distention, abdominal pain, anal bleeding, blood in stool, change in bowel habit, diarrhea, nausea, rectal pain, vomiting, reflux and fecal incontinence.   Genitourinary:  Negative for bladder incontinence, dysuria and hematuria.   Musculoskeletal:  Positive for joint swelling. Negative for arthralgias and back pain.   Integumentary:  Negative for color change and rash.    Allergic/Immunologic: Negative for environmental allergies and food allergies.   Neurological:  Negative for seizures and headaches.   Hematological:  Negative for adenopathy. Does not bruise/bleed easily.        Past Medical History:   Diagnosis Date    Anxiety disorder, unspecified     Arthritis     BMI 29.0-29.9,adult     Colon polyp     Disorder of thyroid, unspecified     Dyslipidemia     Essential (primary) hypertension     Fibromyalgia     GERD (gastroesophageal reflux disease)     High blood pressure     Migraines     Mild intermittent asthma, uncomplicated     Rheumatoid arthritis, unspecified     Sleep apnea, unspecified     C PAP    Systemic lupus erythematosus, organ or system involvement unspecified      Past Surgical History:   Procedure Laterality Date    APPENDECTOMY      BACK SURGERY  2007     SECTION      CHOLECYSTECTOMY      COLONOSCOPY      KNEE SURGERY Right     TOE SURGERY Left     UPPER GASTROINTESTINAL ENDOSCOPY       Family History   Problem Relation Age of Onset    Ovarian cancer Mother 84    Hypertension Mother     Dementia Father     Liver cancer Neg Hx     Liver disease Neg Hx     Esophageal cancer Neg Hx     Pancreatic cancer Neg Hx     Stomach cancer Neg Hx     Colon cancer Neg Hx     Ulcerative colitis Neg Hx     Crohn's disease Neg Hx     Throat cancer Neg Hx      Social History     Tobacco Use    Smoking status: Never    Smokeless tobacco: Never   Substance Use Topics    Alcohol use: Never    Drug use: Never     Review of patient's allergies indicates:   Allergen Reactions    Latex, natural rubber Swelling    Penicillin Swelling        Medication List with Changes/Refills   Current Medications    AMITRIPTYLINE (ELAVIL) 50 MG TABLET    Take 150 mg by mouth every evening.    BELIMUMAB (BENLYSTA IV)    Inject into the vein every 28 days.    GABAPENTIN (NEURONTIN) 300 MG CAPSULE    Take 300 mg by mouth nightly.    HYDROCODONE-ACETAMINOPHEN (NORCO) 5-325 MG PER TABLET    Take 1  "tablet by mouth every 6 (six) hours as needed.    LISINOPRIL (PRINIVIL,ZESTRIL) 20 MG TABLET    Take 20 mg by mouth once daily.    MONTELUKAST (SINGULAIR) 10 MG TABLET    Take 10 mg by mouth once daily at 6am.    OLOPATADINE (PATANOL) 0.1 % OPHTHALMIC SOLUTION    Place 1 drop into both eyes.    PILOCARPINE (SALAGEN) 5 MG TAB    Take 5 mg by mouth 3 (three) times daily.    TIZANIDINE (ZANAFLEX) 2 MG TABLET    Take 2 mg by mouth 2 (two) times daily as needed.   Changed and/or Refilled Medications    Modified Medication Previous Medication    PANTOPRAZOLE (PROTONIX) 40 MG TABLET pantoprazole (PROTONIX) 40 MG tablet       Take 1 tablet (40 mg total) by mouth once daily.    Take 1 tablet (40 mg total) by mouth once daily.   Discontinued Medications    ALPRAZOLAM (XANAX) 0.5 MG TABLET    Take 0.5 mg by mouth daily as needed.    ATORVASTATIN (LIPITOR) 40 MG TABLET    Take 40 mg by mouth every evening.    LISINOPRIL 10 MG TABLET    Take 10 mg by mouth 2 (two) times daily.    PREDNISONE (DELTASONE) 5 MG TABLET    Take 5 mg by mouth once daily at 6am.    SOD SULF-POT CHLORIDE-MAG SULF (SUTAB) 1.479-0.188- 0.225 GRAM TABLET    Take 12 tablets by mouth once daily. Take according to package instructions with indicated amount of water. No breakfast day before test. May substitute with Suprep, Clenpiq, Plenvu, Moviprep or GoLytely based on Rx plan and patient preference.    ZOLPIDEM (AMBIEN) 5 MG TAB    Take 5 mg by mouth once daily.         Vital Signs:  BP (!) 149/83 (BP Location: Left arm, Patient Position: Sitting)   Pulse 77   Ht 5' 4" (1.626 m)   Wt 76.7 kg (169 lb 3.2 oz)   SpO2 97%   BMI 29.04 kg/m²         Physical Exam  Vitals reviewed.   Constitutional:       General: She is awake. She is not in acute distress.     Appearance: Normal appearance. She is well-developed. She is not ill-appearing, toxic-appearing or diaphoretic.   HENT:      Head: Normocephalic and atraumatic.      Nose: Nose normal.      Mouth/Throat: "      Mouth: Mucous membranes are moist.      Pharynx: Oropharynx is clear. No oropharyngeal exudate or posterior oropharyngeal erythema.   Eyes:      General: Lids are normal. Gaze aligned appropriately. No scleral icterus.        Right eye: No discharge.         Left eye: No discharge.      Conjunctiva/sclera: Conjunctivae normal.   Neck:      Trachea: Trachea normal.   Cardiovascular:      Rate and Rhythm: Normal rate and regular rhythm.      Pulses:           Radial pulses are 2+ on the right side and 2+ on the left side.   Pulmonary:      Effort: Pulmonary effort is normal. No respiratory distress.      Breath sounds: No stridor. No wheezing.   Chest:      Chest wall: No tenderness.   Abdominal:      General: Bowel sounds are normal. There is no distension.      Palpations: Abdomen is soft. There is no fluid wave, hepatomegaly or mass.      Tenderness: There is no abdominal tenderness. There is no guarding or rebound.   Musculoskeletal:         General: No tenderness or deformity.      Cervical back: Full passive range of motion without pain and neck supple. No tenderness.      Right lower leg: No edema.      Left lower leg: No edema.   Lymphadenopathy:      Cervical: No cervical adenopathy.   Skin:     General: Skin is warm and dry.      Capillary Refill: Capillary refill takes less than 2 seconds.      Coloration: Skin is not cyanotic, jaundiced or pale.   Neurological:      General: No focal deficit present.      Mental Status: She is alert and oriented to person, place, and time.      Motor: No tremor.   Psychiatric:         Attention and Perception: Attention normal.         Mood and Affect: Mood and affect normal.         Speech: Speech normal.         Behavior: Behavior normal. Behavior is cooperative.            All of the data above and below has been reviewed by myself and any further interpretations will be reflected in the assessment and plan.   The data includes review of external notes, and  independent interpretation of lab results, procedures, x-rays, and imaging reports.      Assessment:  Chronic idiopathic constipation    Gastroesophageal reflux disease, unspecified whether esophagitis present  -     pantoprazole (PROTONIX) 40 MG tablet; Take 1 tablet (40 mg total) by mouth once daily.  Dispense: 90 tablet; Refill: 4    Esophageal stricture    Delayed gastric emptying    History of colon polyps    Colonoscopy due 2/2026  Constipation: attributes to narcotic use. Taking MiraLAX.  GERD controlled with panto 40 daily.   Dysphagia: does not feel she needs dilation at this time.      Recommendations:  Continue MiraLAX. Start with 1/2 capful daily, and titrate dose up or down until you are having daily, soft bowel movements.  Continue pantoprazole 40 mg daily.  Notify my office if you feel you need esophagus stretched again.    Risks, benefits, and alternatives of medical management, any associated procedures, and/or treatment discussed with the patient. Patient given opportunity to ask questions and voices understanding. Patient has elected to proceed with the recommended care modalities as discussed.    Instructed patient to notify my office if they have not been contacted within two weeks after any procedures, submitting any samples (biopsies, blood, stool, urine, etc.) or after any imaging (X-ray, CT, MRI, etc.).     Follow up in about 1 year (around 11/14/2024).    Order summary:  No orders of the defined types were placed in this encounter.     This assessment, plan, and documentation was performed in collaboration with Megan Santana NP.      This document may have been created using a voice recognition transcribing system. Incorrect words or phrases may have been missed during proofreading. Please interpret accordingly or contact me for clarification.     Iveth Tejada MD

## 2023-11-14 NOTE — PATIENT INSTRUCTIONS
Continue MiraLAX. Start with 1/2 capful daily, and titrate dose up or down until you are having daily, soft bowel movements.  Continue pantoprazole 40 mg daily.  Notify my office if you feel you need esophagus stretched again.

## 2023-11-14 NOTE — LETTER
November 14, 2023        Heather Ghosh MD  [address] [facility]  [address]  [place] [address]             [facility]  [address] [address]  Phone: [phone]  Fax: [phone]   Patient: Kate Blevins   MR Number: [id number]   YOB: 1955   Date of Visit: 11/14/2023       Dear Dr. Ghosh:    Thank you for referring Kate Blevins to me for evaluation. Attached you will find relevant portions of my assessment and plan of care.    If you have questions, please do not hesitate to call me. I look forward to following Kate Blevins along with you.    Sincerely,      Iveth Tejada MD            CC  No Recipients    Enclosure

## 2024-02-05 ENCOUNTER — TELEPHONE (OUTPATIENT)
Dept: GASTROENTEROLOGY | Facility: CLINIC | Age: 69
End: 2024-02-05
Payer: MEDICARE

## 2024-02-05 NOTE — TELEPHONE ENCOUNTER
"Spoke to patient. She went to have a BM and only had "white mucus and some green stuff". Pt reports constipation. She has not taken anything yet but does have MiraLAX. She reports "an odor" with the contents. Pt asking for recommendations on what to do. She states that she read that it could mean cancer. Pt also wanted me to let ou know that she does have lupus. No pain or fever noted, and states nothing has changed medication-wise. -KG  "

## 2024-02-05 NOTE — TELEPHONE ENCOUNTER
----- Message from Nita Santamaria sent at 2/5/2024  1:05 PM CST -----  Contact: Patient  Patient is calling to request a call back in regard to a personal matter and is in need of medical opinion, please give patient a call back at [phone]

## 2024-02-06 NOTE — TELEPHONE ENCOUNTER
Mucus is normal and not concerning. The majority of time the odor is diet related and may worsen if constipated. Would recommend Miralax titration. If she starts to have diarrhea with mucus we can order stool tests to r/o infection.  KN

## 2024-02-08 NOTE — TELEPHONE ENCOUNTER
Patient was notified of what KN said and voiced understanding. She said it was just that one time. All is well now. - dmp

## 2024-08-05 ENCOUNTER — TELEPHONE (OUTPATIENT)
Dept: GASTROENTEROLOGY | Facility: CLINIC | Age: 69
End: 2024-08-05
Payer: MEDICARE

## 2024-08-05 NOTE — TELEPHONE ENCOUNTER
Patient is experiencing some issues. Patient stated she felt like her throat was closing. Patient took and stuck her fingers down there throat to get a benadryl down. Patient stated she then laid down and she stated it seemed to help a very little. Patient is now experiencing nausea and vomiting every time she goes to bathroom. Patient is not able to keep anything down. Patient wants to know if she can get some nausea med's call out. Patient is on next available on 8/29/24 @ 10:00. Please advise. 8/5/24 LRA     ----- Message from Maggie Mata sent at 8/5/2024  9:57 AM CDT -----  Type:  Sooner Apoointment Request    Caller is requesting a sooner appointment.  Caller declined first available appointment listed below.  Caller will not accept being placed on the waitlist and is requesting a message be sent to doctor.  Name of Caller:pt  When is the first available appointment?no appts available  Symptoms:diarrhea, vomitting, and throat closed up  Would the patient rather a call back or a response via [facility]? call  Best Call Back Number:[phone]    Additional Information: requesting an apt asap and also requesting medication for nauseas if not able to be seen asap..      [facility] on [address] [facility]

## 2024-08-12 NOTE — TELEPHONE ENCOUNTER
Will need to get update on patients symptoms. If still with dysphagia will need to be scheduled for upper endoscopy. Keep OV as scheduled.    She went to ER 8/6/2024 due to HA, HTN, Vomiting, CP. Cardiac workup negative.  Apparently had similar episode 7/2024 with unsteady gait. Has not been following up with PCP.  Hgb 11.9L MCV 85, K3.5L, Ca 8.7L, ALP 118H, Albumin 3.2L

## 2024-08-29 ENCOUNTER — TELEPHONE (OUTPATIENT)
Dept: GASTROENTEROLOGY | Facility: CLINIC | Age: 69
End: 2024-08-29

## 2024-08-29 ENCOUNTER — OFFICE VISIT (OUTPATIENT)
Dept: GASTROENTEROLOGY | Facility: CLINIC | Age: 69
End: 2024-08-29
Payer: MEDICARE

## 2024-08-29 VITALS
HEART RATE: 68 BPM | BODY MASS INDEX: 29.88 KG/M2 | DIASTOLIC BLOOD PRESSURE: 81 MMHG | RESPIRATION RATE: 16 BRPM | SYSTOLIC BLOOD PRESSURE: 132 MMHG | HEIGHT: 63 IN | OXYGEN SATURATION: 97 % | WEIGHT: 168.63 LBS

## 2024-08-29 DIAGNOSIS — Z86.010 HISTORY OF COLON POLYPS: ICD-10-CM

## 2024-08-29 DIAGNOSIS — R13.10 DYSPHAGIA, UNSPECIFIED TYPE: ICD-10-CM

## 2024-08-29 DIAGNOSIS — K30 DELAYED GASTRIC EMPTYING: ICD-10-CM

## 2024-08-29 DIAGNOSIS — K59.04 CHRONIC IDIOPATHIC CONSTIPATION: ICD-10-CM

## 2024-08-29 DIAGNOSIS — K22.2 ESOPHAGEAL STRICTURE: ICD-10-CM

## 2024-08-29 DIAGNOSIS — K21.9 GASTROESOPHAGEAL REFLUX DISEASE, UNSPECIFIED WHETHER ESOPHAGITIS PRESENT: Primary | ICD-10-CM

## 2024-08-29 PROCEDURE — 1159F MED LIST DOCD IN RCRD: CPT | Mod: CPTII,S$GLB,,

## 2024-08-29 PROCEDURE — 1160F RVW MEDS BY RX/DR IN RCRD: CPT | Mod: CPTII,S$GLB,,

## 2024-08-29 PROCEDURE — 3075F SYST BP GE 130 - 139MM HG: CPT | Mod: CPTII,S$GLB,,

## 2024-08-29 PROCEDURE — 99214 OFFICE O/P EST MOD 30 MIN: CPT | Mod: S$GLB,,,

## 2024-08-29 PROCEDURE — 3008F BODY MASS INDEX DOCD: CPT | Mod: CPTII,S$GLB,,

## 2024-08-29 PROCEDURE — 4010F ACE/ARB THERAPY RXD/TAKEN: CPT | Mod: CPTII,S$GLB,,

## 2024-08-29 PROCEDURE — 3079F DIAST BP 80-89 MM HG: CPT | Mod: CPTII,S$GLB,,

## 2024-08-29 RX ORDER — ESTRADIOL 1 MG/1
1 TABLET ORAL DAILY
COMMUNITY
Start: 2024-08-25

## 2024-08-29 RX ORDER — CITALOPRAM 10 MG/1
10 TABLET ORAL DAILY
COMMUNITY
Start: 2024-08-27

## 2024-08-29 RX ORDER — ATORVASTATIN CALCIUM 40 MG/1
40 TABLET, FILM COATED ORAL NIGHTLY
COMMUNITY
Start: 2024-07-31

## 2024-08-29 NOTE — PROGRESS NOTES
Clinic Note    Reason for visit:  The primary encounter diagnosis was Gastroesophageal reflux disease, unspecified whether esophagitis present. Diagnoses of Esophageal stricture, Dysphagia, unspecified type, Delayed gastric emptying, Chronic idiopathic constipation, and History of colon polyps were also pertinent to this visit.    PCP: Heather Ghosh       HPI:  This is a 69 y.o. female who is established. She has been having episodes of N/V, dysphagia. She went to ER 8/6/2024 due to HA, HTN, vomiting, CP. Cardiac workup negative. Apparently had similar episode 7/2024 with unsteady gait. Patient with GERD and esophageal stricture. She states dysphagia worsening. She feels she needs esophageal dilation. She was scheduled previously but canceled after her swallowing improved. She has dysphagia with all meats. Sometimes has trouble swallowing liquids or her saliva. Denies NSAID use. She is taking pantoprazole 40 mg daily. She was having constipation due to taking pain medication at last OV and was taking MiraLAX at that time. She still has some issues with constipation but does not take MiraLAX consistently. No blood in stool.     8/2024: Hgb 11.9L MCV 85, K3.5L, Ca 8.7L, ALP 118H, Albumin 3.2L     Colonoscopy 2/14/2023: IH. Normal otherwise. Repeat colon in 3 yr  EGD 8/9/2022: Esophagogastric stricture s/p dilation to 54 Fr, small HH    Review of Systems   Constitutional:  Positive for fatigue and unexpected weight change. Negative for fever.   HENT:  Positive for trouble swallowing. Negative for mouth sores, postnasal drip and sore throat.    Eyes:  Positive for eye dryness. Negative for pain and discharge.   Respiratory:  Positive for choking. Negative for apnea, cough, chest tightness, shortness of breath and wheezing.    Cardiovascular:  Negative for chest pain, palpitations and leg swelling.   Gastrointestinal:  Positive for vomiting and reflux. Negative for abdominal distention, abdominal pain, anal bleeding,  blood in stool, change in bowel habit, constipation, diarrhea, nausea, rectal pain and fecal incontinence.   Genitourinary:  Negative for bladder incontinence, dysuria and hematuria.   Musculoskeletal:  Positive for joint swelling. Negative for arthralgias and back pain.   Integumentary:  Negative for color change and rash.   Allergic/Immunologic: Positive for environmental allergies and food allergies.   Neurological:  Negative for seizures and headaches.   Hematological:  Negative for adenopathy. Does not bruise/bleed easily.        Past Medical History:   Diagnosis Date    Anxiety disorder, unspecified     Arthritis     BMI 29.0-29.9,adult     Colon polyp     Disorder of thyroid, unspecified     Dyslipidemia     Essential (primary) hypertension     Fibromyalgia     GERD (gastroesophageal reflux disease)     High blood pressure     Migraines     Mild intermittent asthma, uncomplicated     Rheumatoid arthritis, unspecified     Sleep apnea, unspecified     C PAP    Systemic lupus erythematosus, organ or system involvement unspecified      Past Surgical History:   Procedure Laterality Date    APPENDECTOMY      BACK SURGERY  2007     SECTION      CHOLECYSTECTOMY      COLONOSCOPY      KNEE SURGERY Right     TOE SURGERY Left     UPPER GASTROINTESTINAL ENDOSCOPY       Family History   Adopted: Yes   Problem Relation Name Age of Onset    Ovarian cancer Mother  84    Hypertension Mother      Dementia Father      Liver cancer Neg Hx      Liver disease Neg Hx      Esophageal cancer Neg Hx      Pancreatic cancer Neg Hx      Stomach cancer Neg Hx      Colon cancer Neg Hx      Ulcerative colitis Neg Hx      Crohn's disease Neg Hx      Throat cancer Neg Hx       Social History     Tobacco Use    Smoking status: Never    Smokeless tobacco: Never   Substance Use Topics    Alcohol use: Never    Drug use: Never     Review of patient's allergies indicates:   Allergen Reactions    Latex, natural rubber Swelling    Penicillin  "Swelling    Shellfish containing products Anaphylaxis    Tomato Anaphylaxis     Tomato Sauce ONLY      Medication List with Changes/Refills   Current Medications    AMITRIPTYLINE (ELAVIL) 50 MG TABLET    Take 150 mg by mouth every evening.    ATORVASTATIN (LIPITOR) 40 MG TABLET    Take 40 mg by mouth every evening.    BELIMUMAB (BENLYSTA IV)    Inject into the vein every 28 days.    CITALOPRAM (CELEXA) 10 MG TABLET    Take 10 mg by mouth once daily.    ESTRADIOL (ESTRACE) 1 MG TABLET    Take 1 mg by mouth once daily.    GABAPENTIN (NEURONTIN) 300 MG CAPSULE    Take 300 mg by mouth nightly.    HYDROCODONE-ACETAMINOPHEN (NORCO) 5-325 MG PER TABLET    Take 1 tablet by mouth every 6 (six) hours as needed.    LISINOPRIL (PRINIVIL,ZESTRIL) 20 MG TABLET    Take 20 mg by mouth once daily.    MONTELUKAST (SINGULAIR) 10 MG TABLET    Take 10 mg by mouth once daily at 6am.    PANTOPRAZOLE (PROTONIX) 40 MG TABLET    Take 1 tablet (40 mg total) by mouth once daily.    PILOCARPINE (SALAGEN) 5 MG TAB    Take 5 mg by mouth 3 (three) times daily.    TIZANIDINE (ZANAFLEX) 2 MG TABLET    Take 2 mg by mouth 2 (two) times daily as needed.   Discontinued Medications    OLOPATADINE (PATANOL) 0.1 % OPHTHALMIC SOLUTION    Place 1 drop into both eyes.         Vital Signs:  /81 (BP Location: Left arm, Patient Position: Sitting, BP Method: Large (Automatic))   Pulse 68   Resp 16   Ht 5' 3" (1.6 m)   Wt 76.5 kg (168 lb 9.6 oz)   SpO2 97%   BMI 29.87 kg/m²        Physical Exam  Vitals reviewed.   Constitutional:       General: She is awake. She is not in acute distress.     Appearance: Normal appearance. She is well-developed. She is not ill-appearing, toxic-appearing or diaphoretic.   HENT:      Head: Normocephalic and atraumatic.      Nose: Nose normal.      Mouth/Throat:      Mouth: Mucous membranes are moist.      Pharynx: Oropharynx is clear. No oropharyngeal exudate or posterior oropharyngeal erythema.   Eyes:      General: Lids " are normal. Gaze aligned appropriately. No scleral icterus.        Right eye: No discharge.         Left eye: No discharge.      Conjunctiva/sclera: Conjunctivae normal.   Neck:      Trachea: Trachea normal.   Cardiovascular:      Rate and Rhythm: Normal rate and regular rhythm.      Pulses:           Radial pulses are 2+ on the right side and 2+ on the left side.   Pulmonary:      Effort: Pulmonary effort is normal. No respiratory distress.      Breath sounds: No stridor. No wheezing.   Chest:      Chest wall: No tenderness.   Abdominal:      General: Bowel sounds are normal. There is no distension.      Palpations: Abdomen is soft. There is no fluid wave, hepatomegaly or mass.      Tenderness: There is no abdominal tenderness. There is no guarding or rebound.   Musculoskeletal:         General: No tenderness or deformity.      Cervical back: Full passive range of motion without pain and neck supple. No tenderness.      Right lower leg: No edema.      Left lower leg: No edema.   Lymphadenopathy:      Cervical: No cervical adenopathy.   Skin:     General: Skin is warm and dry.      Capillary Refill: Capillary refill takes less than 2 seconds.      Coloration: Skin is not cyanotic, jaundiced or pale.   Neurological:      General: No focal deficit present.      Mental Status: She is alert and oriented to person, place, and time.      Motor: No tremor.   Psychiatric:         Attention and Perception: Attention normal.         Mood and Affect: Mood and affect normal.         Speech: Speech normal.         Behavior: Behavior normal. Behavior is cooperative.            All of the data above and below has been reviewed by myself and any further interpretations will be reflected in the assessment and plan.   The data includes review of external notes, and independent interpretation of lab results, procedures, x-rays, and imaging reports.      Assessment:  Gastroesophageal reflux disease, unspecified whether esophagitis  present  -     Ambulatory Referral to External Surgery    Esophageal stricture  -     Ambulatory Referral to External Surgery    Dysphagia, unspecified type  -     Ambulatory Referral to External Surgery    Delayed gastric emptying    Chronic idiopathic constipation    History of colon polyps    Colonoscopy due 2/2026  Constipation: attributes to narcotic use. Discussed taking MiraLAX.  GERD controlled with panto 40 daily.   Dysphagia: last dilation with 54 Fr 2022. Solids and liquids. Plan for repeat EGD w/EBx and dilation. If no relief, plan for EM.      Recommendations:  Schedule upper endoscopy with Dr. Tejada.   Continue pantoprazole 40 mg daily.   Begin taking MiraLAX. Start with 1/2 capful daily, and titrate dose up or down until you are having daily, soft bowel movements.    Risks, benefits, and alternatives of medical management, any associated procedures, and/or treatment discussed with the patient. Patient given opportunity to ask questions and voices understanding. Patient has elected to proceed with the recommended care modalities as discussed.    Keep follow up with Dr. Tejada.     Order summary:  Orders Placed This Encounter   Procedures    Ambulatory Referral to External Surgery        Instructed patient to notify my office if they have not been contacted within two weeks after any procedures, submitting any samples (biopsies, blood, stool, urine, etc.) or after any imaging (X-ray, CT, MRI, etc.).      Megan Santana NP    This document may have been created using a voice recognition transcribing system. Incorrect words or phrases may have been missed during proofreading. Please interpret accordingly or contact me for clarification.

## 2024-08-29 NOTE — PATIENT INSTRUCTIONS
Schedule upper endoscopy with Dr. Tejada.   Continue pantoprazole 40 mg daily.   Begin taking MiraLAX. Start with 1/2 capful daily, and titrate dose up or down until you are having daily, soft bowel movements.    Please notify my office if you have not been contacted within two weeks after any procedures, submitting any samples (biopsies, blood, stool, urine, etc.) or after any imaging (X-ray, CT, MRI, etc.).

## 2024-08-29 NOTE — TELEPHONE ENCOUNTER
Patient was given instructions and they were reviewed with patient. Patient was given AVS with apt details. Patients order for egd was faxed to central scheduling at [facility]. 8/29/24 lra

## 2024-09-03 ENCOUNTER — NURSE TRIAGE (OUTPATIENT)
Dept: ADMINISTRATIVE | Facility: CLINIC | Age: 69
End: 2024-09-03
Payer: MEDICARE

## 2024-09-03 NOTE — TELEPHONE ENCOUNTER
"Pt asking what time to arrive for procedure this AM. Per pt appt notes:  Visit Type:"OUTSIDE SERVICE  Patient Instructions:  OUTSIDE SERVICE  Please arrive approximately 15 minutes before your scheduled appointment time and ensure that you have a valid government issued ID and your insurance card. ePre-Check is available and completion prior to your arrival will assist with a quicker registration process." Pt verbalizes understanding to call back for any further questions or concerns.     Reason for Disposition   Question about upcoming scheduled surgery, procedure or test, no triage required, and triager able to answer question    Protocols used: Information Only Call - No Triage-A-    "

## 2024-09-04 ENCOUNTER — OUTSIDE PLACE OF SERVICE (OUTPATIENT)
Dept: GASTROENTEROLOGY | Facility: CLINIC | Age: 69
End: 2024-09-04
Payer: MEDICARE

## 2024-09-05 ENCOUNTER — TELEPHONE (OUTPATIENT)
Dept: GASTROENTEROLOGY | Facility: CLINIC | Age: 69
End: 2024-09-05
Payer: MEDICARE

## 2024-09-05 NOTE — TELEPHONE ENCOUNTER
----- Message from Nehal Javier sent at 9/5/2024  4:48 PM CDT -----  Regarding: Return Call  Contact: patient  Type:  Patient Call Back    Who Called:Kate   Who Left Message for Patient:Haley  Does the patient know what this is regarding?:no   Would the patient rather a call back or a response via [facility]? Call back   Best Call Back Number: [phone] (home)     Additional Information:     DIXON Ladd

## 2024-09-05 NOTE — TELEPHONE ENCOUNTER
"Staff attempted to call pt back.. phone ring and a voice comes on saying "you have been upgraded"  I hung up because it sound like some type of 800 number ad..  so I called back just to make sure I didn't dial the wrong number.. it said "dial 1" I disconnected the phone...  staff called a 3rd time to make sure again... it said to dial 1 to be connected... I disconnected the call....   ldn  "

## 2024-09-15 ENCOUNTER — TELEPHONE (OUTPATIENT)
Dept: GASTROENTEROLOGY | Facility: CLINIC | Age: 69
End: 2024-09-15

## 2024-09-15 DIAGNOSIS — R13.10 DYSPHAGIA, UNSPECIFIED TYPE: Primary | ICD-10-CM

## 2024-09-16 NOTE — TELEPHONE ENCOUNTER
midEBx reflux s/p 51Fr dil.    Notify patient that her EBx are benign.  How are her swallowing Sx after that dilation? If not improved, then rec EM.  NBP

## 2024-09-30 NOTE — TELEPHONE ENCOUNTER
EM order signed.     Would recommend to make sure she is taking MiraLax daily. She should first try to take GasX with meals.  DRAGAN

## 2024-09-30 NOTE — TELEPHONE ENCOUNTER
Called patient to inform her of results of Ebx. Patient stated that she is still having trouble swallowing after dilation that sometimes it hard to swallow spit and she has significantly has reduced the food intake.    Patient agreed to EM. She will come to clinic to sign consent forms.    Patient also wanted to know if there was something that could be recommended because she has started suffering with lots of gas.    Patient was advised that I would send message back to providers.-GILBERT,LPN

## 2024-10-06 NOTE — TELEPHONE ENCOUNTER
Message  Received: 3 days ago   Pt Advice, Appointment Access, Requesting Orders  Maggie Mata Staff  Caller: Unspecified (3 days ago, 11:07 AM)  Type:  Patient Requesting Referral    Who Called:pt  Does the patient already have the specialty appointment scheduled?:no  If yes, what is the date of that appointment?:na  Referral to What Specialty:radiology  Reason for Referral:upper gi  Does the patient want the referral with a specific physician?:na  Is the specialist an [facility]?:na  Patient Requesting a Response?:call  Would the patient rather a call back or a response via [facility]? call  Best Call Back Number:[phone]    Additional Information: requesting a call as she has been waiting to schedule and hasn't received a call for over a week now

## 2024-10-06 NOTE — TELEPHONE ENCOUNTER
Called and spoke with patient. She was advised that we'd need the consents signed first. Patient said she'll be by here tomorrow to sign them. EM consents placed at the . Patient was notified of KN's remarks. She will continue with MiraLax daily. Patient said she stopped consuming so much food and started implementing more fruits and vegetables to her daily diet and it seems to cause a lot of gas. Patient will be taking GasX with meals.  DMP

## 2024-11-13 ENCOUNTER — TELEPHONE (OUTPATIENT)
Dept: GASTROENTEROLOGY | Facility: CLINIC | Age: 69
End: 2024-11-13
Payer: MEDICARE

## 2024-11-13 NOTE — TELEPHONE ENCOUNTER
Returned pt call. Pt stated she is out of town and will not make to her 1 yr OV tomorrow. Pt would like to r/s. I told her that [facility]BP is scheduling out into next year or if she doesn't mind seeing one of NP's I can schedule in January. Pt stated she would like to see [facility].  R/s 1 yr OV with [facility] for 1/8/24. diana

## 2024-11-13 NOTE — TELEPHONE ENCOUNTER
----- Message from [employer] sent at 11/13/2024  4:01 PM CST -----  Contact: COLT SANDOVAL [[id number]]  .Type:  Patient Requesting Call    Who Called:COLT SANDOVAL [[id number]]  Does the patient know what this is regarding?:pt wants to reschedule 11/14 appt  Would the patient rather a call back or a response via [facility]? call  Best Call Back Number:.[phone]    Additional Information:

## 2024-11-17 ENCOUNTER — TELEPHONE (OUTPATIENT)
Dept: GASTROENTEROLOGY | Facility: CLINIC | Age: 69
End: 2024-11-17
Payer: MEDICARE

## 2024-11-17 NOTE — TELEPHONE ENCOUNTER
Patient has follow up OV with us 1/8/2025.  Have patient come in to sign EM consents so that may be completed in the mean time.  NBP

## 2024-11-17 NOTE — TELEPHONE ENCOUNTER
Called and spoke with patient. She advised she will come to the office tomorrow to sign EM consents. DAYA

## 2025-07-02 ENCOUNTER — TELEPHONE (OUTPATIENT)
Dept: GASTROENTEROLOGY | Facility: CLINIC | Age: 70
End: 2025-07-02
Payer: MEDICARE

## 2025-07-02 NOTE — TELEPHONE ENCOUNTER
Copied from CRM #[id number]. Topic: Medications - Medication Refill  >> Jul 2, 2025  1:43 PM Maria Ines wrote:  Type:  RX Refill Request    Who Called: Kate Blevins  Refill or New Rx:refill  RX Name and Strength:pantoprazole (PROTONIX) 40 MG tablet  How is the patient currently taking it? (ex. 1XDay):daily  Is this a 30 day or 90 day RX:90  Preferred Pharmacy with phone number:  [id number] - [place], [place]  [facility] [facility]  [address] [place] [id number]  Phone: [phone] Fax: [phone]    Local or Mail Order:local  Ordering Provider:cornelio  Would the patient rather a call back or a response via [employer]? colleen  Best Call Back Number:[phone]  Additional Information: n/a      Patient is wanting a refill in medication. -abo